# Patient Record
(demographics unavailable — no encounter records)

---

## 2024-10-22 NOTE — HISTORY OF PRESENT ILLNESS
[FreeTextEntry1] : This is a 60 year old woman with hyperlipidemia who presents to the office for a cardiac evaluation.  She recently had an episode of dyspnea with exertion that concerned her.  She was running for the train at [employer], and was extremely breathless when she got on.  She did not have chest pain.  She denies PND, orthopnea, LE swelling, dizziness, or syncope.  She has had stress testing 20 years ago which she reports was normal.  HR and BP are normal.  Her LDL is well controlled.

## 2024-10-22 NOTE — DISCUSSION/SUMMARY
[FreeTextEntry1] : This is a 60 year old woman with hyperlipidemia who presents to the office for a cardiac evaluation.  She recently had an episode of dyspnea with exertion that concerned her.  She was running for the train at [employer], and was extremely breathless when she got on.   I am referring her for an echocardiogram to assess Her cardiac structure and function, as well as an exercise stress test to assess Her hemodynamic response to exercise, and to assess for the presence of obstructive coronary artery disease.   I will call with the results, and to schedule any necessary follow up.  We discussed the benefits of a healthy diet, regular exercise and physical activity, and weight loss in detail. [EKG obtained to assist in diagnosis and management of assessed problem(s)] : EKG obtained to assist in diagnosis and management of assessed problem(s)

## 2024-11-22 NOTE — PHYSICAL EXAM
[Oriented To Time, Place, And Person] : oriented to person, place, and time [Affect] : the affect was normal [Mood] : the mood was normal [Not Anxious] : not anxious [General Appearance - Well Developed] : well developed [General Appearance - Well Nourished] : well nourished [Normal Appearance] : normal appearance [Well Groomed] : well groomed [General Appearance - In No Acute Distress] : no acute distress [] : no respiratory distress [Exaggerated Use Of Accessory Muscles For Inspiration] : no accessory muscle use

## 2024-11-22 NOTE — ASSESSMENT
[FreeTextEntry1] : She is trying to stay more hydrated.  24-hour urine collection results were discussed once again.  In follow-up to having history of kidney stones she will undergo renal ultrasound.  We will discuss the results on the phone.  Pending results, she can follow-up in 1 year.

## 2024-11-22 NOTE — HISTORY OF PRESENT ILLNESS
[FreeTextEntry1] : She is a 60-year-old woman who is seen today for kidney stones.  She does not have any new urinary symptoms.  There is no flank pain.  She has no hematuria.  24-hour urine collection has been discussed.  She is trying to remain more hydrated.  Previous notes: Ureteroscopy and laser lithotripsy was done in May 2024, stent has been removed. Stone was composed of 50% calcium oxalate dihydrate, 30% calcium phosphate, and 20% calcium oxalate monohydrate. 24 hour urine collection showed low volume 810 mL, high saturation of calcium oxalate and calcium phosphate, normal calcium 193, oxalate 25, citrate 449, pH 5.8 and sodium 56.  Around October 2023 she had a urinary tract infection.  Then in January 2024 she developed blood in her urine.  She was seen by urogynecology and underwent cystoscopy which was normal.  Urinalysis and urine culture were negative.  CT scan showed an 8 mm left renal pelvic stone and a 4 mm right-sided angiomyolipoma.

## 2025-01-22 NOTE — ASSESSMENT
[FreeTextEntry1] : 60 F with neck pain and spasm Begin PT  We will also provide a prescription for anti-inflammatories.  Discussed major side effects of medication including but not limited to gastritis and acute kidney injury.  She was instructed to take with food and to discontinue use if stomach or esophageal pain developed.  FU 6 weeks

## 2025-01-22 NOTE — HISTORY OF PRESENT ILLNESS
[Neck] : neck [de-identified] : 01/22/2025: 60 year old RHD female presents with 5 month history of left sided neck pain and spasm. She reports pain and limited ROM. Denies radicular pain, n/t. Taking OTC medications prn. no red flg signs. no issues with fine motor movement or dexterity. no bowel or bladder symptoms.   PmHx: HTN, HLD All: Amoxicillin (Rash)  [] : no

## 2025-01-24 NOTE — HISTORY OF PRESENT ILLNESS
[de-identified] : Went to cardiology- followed by cardiology. Had stress and echo- normal. Went to urology for kidney stone Followed by hem for anemia.

## 2025-01-24 NOTE — ASSESSMENT
[FreeTextEntry1] : HTN- stable on meds. Continue lisinopril 20mg  Hyperlipidemia- rosuvastatin 10mg daily INDIO- continue Lexapro 10mg and Buspar 15mg bid. HCM needs repeat colon.

## 2025-03-25 NOTE — ASSESSMENT
[FreeTextEntry1] : Right X-Ray Examination of the KNEE (4 views): medial and patellofemoral degenerate changes.  exacerbation of underlying arthritis   - We discussed their diagnosis and treatment options at length including the risks and benefits of both surgical treatment with a knee replacement and non-surgical options. Surgical risks include but are not limited to pain, infection, bleeding, vascular injury, numbness, tingling, nerve damage. - Due to risks of surgery, they will continue conservative treatment with PT, icing, and anti-inflammatory medications - The patient was provided with a PT prescription to work on ROM, hip ER/abductors strengthening, quad/hamstring stretches and strengthening, and other exercises - The patient was advised to let pain guide the gradual advancement of activities. - The patient was advised to apply ice (wrapped in a towel or protective covering) to the area daily (20 minutes at a time, 2-4X/day). - mobic rx - Patient was given a prescription for an anti-inflammatory medication.  They will take it for the next week and then on an as needed basis, as long as there are no medical contra-indications.  Patient is counseled on possible GI, renal, and cardiovascular side effects. - We also discussed the possible of a corticosteroid injection in order to help decrease inflammation and pain so that they can perform better therapy. - The risks, benefits, and alternatives to corticosteroid injection were reviewed with the patient and they wished to proceed with this treatment course. - Follow up in 6 weeks to re-evaluate, if no improvement we spoke about possibility of viscosupplementation injections

## 2025-03-25 NOTE — PHYSICAL EXAM
[Right] : right knee [NL (0)] : extension 0 degrees [5___] : hamstring 5[unfilled]/5 [Equivocal] : equivocal Marilin [] : negative Lachmann [[id number]] : flexion 120 degrees

## 2025-03-25 NOTE — IMAGING
[de-identified] :  RIGHT KNEE Inspection:  mild effusion Palpation: medial joint line tenderness, anterior tenderness Knee Range of Motion:  3-125  Strength: 5/5 Quadriceps strength, 5/5 Hamstring strength Neurological: light touch is intact throughout Ligament Stability and Special Tests:  McMurrays: neg Lachman: neg Pivot Shift: neg Posterior Drawer: neg Valgus: neg Varus: neg Patella Apprehension: neg Patella Maltracking: neg

## 2025-03-25 NOTE — HISTORY OF PRESENT ILLNESS
[de-identified] : 60 year old female  (retired) chronic right knee pain worsening since feb 2025 The pain is located anterior and medial The pain is associated with stiffness Worse with activity and better at rest. Has tried activity modification H/O right knee meniscus tear around 2010, conservative treatment

## 2025-05-20 NOTE — ASSESSMENT
[FreeTextEntry1] : Hypertension good control continue lisinopril 20 mg daily Hyperlipidemia continue rosuvastatin 10 mg daily Generalized anxiety continue Lexapro 10 mg daily BuSpar 15 mg every 12 hours and Ambien as needed bedtime. Chronic knee pain diagnosed with osteoarthritis continue meloxicam 15 mg. Healthcare maintenance up-to-date

## 2025-05-20 NOTE — HISTORY OF PRESENT ILLNESS
[FreeTextEntry1] : Follow-up [de-identified] : Patient here today for renewal of medications.  History of hypertension stable on current medication history of hyperlipidemia stable generalized anxiety stable

## 2025-05-20 NOTE — PHYSICAL EXAM
[No Acute Distress] : no acute distress [Normal Rate] : normal rate  [No Carotid Bruits] : no carotid bruits [Soft] : abdomen soft [No Joint Swelling] : no joint swelling

## 2025-06-10 NOTE — DISCUSSION/SUMMARY
[FreeTextEntry1] : This is a 60 year old woman with hyperlipidemia who presents to the office for a cardiac evaluation.  Recent noninvasive testing showed normal LV function with no significant valvular heart disease.  Her BP and LDL are controlled on her current regimen, which she will continue.   We discussed the benefits of a healthy diet, regular exercise and physical activity, and weight loss in detail.  She will follow in six months.  She knows to call the office with any issues.  [EKG obtained to assist in diagnosis and management of assessed problem(s)] : EKG obtained to assist in diagnosis and management of assessed problem(s)

## 2025-06-10 NOTE — HISTORY OF PRESENT ILLNESS
[FreeTextEntry1] : This is a 60 year old woman with hyperlipidemia who presents to the office for a cardiac evaluation.   She denies chest pain, changes in her baseline MARCUS, PND, orthopnea, LE swelling, dizziness, or syncope.  She has had stress testing and echocardiography in late 2024 which were unrevealing.  Overall she is stable compared to her last visit here.